# Patient Record
Sex: FEMALE | Race: WHITE
[De-identification: names, ages, dates, MRNs, and addresses within clinical notes are randomized per-mention and may not be internally consistent; named-entity substitution may affect disease eponyms.]

---

## 2019-09-30 ENCOUNTER — HOSPITAL ENCOUNTER (INPATIENT)
Dept: HOSPITAL 74 - YASAS | Age: 59
LOS: 4 days | Discharge: HOME | DRG: 773 | End: 2019-10-04
Attending: SURGERY | Admitting: SURGERY
Payer: COMMERCIAL

## 2019-09-30 VITALS — BODY MASS INDEX: 29.2 KG/M2

## 2019-09-30 DIAGNOSIS — R60.0: ICD-10-CM

## 2019-09-30 DIAGNOSIS — Z86.69: ICD-10-CM

## 2019-09-30 DIAGNOSIS — F19.24: ICD-10-CM

## 2019-09-30 DIAGNOSIS — F32.9: ICD-10-CM

## 2019-09-30 DIAGNOSIS — Z86.73: ICD-10-CM

## 2019-09-30 DIAGNOSIS — J45.909: ICD-10-CM

## 2019-09-30 DIAGNOSIS — Z86.711: ICD-10-CM

## 2019-09-30 DIAGNOSIS — D72.819: ICD-10-CM

## 2019-09-30 DIAGNOSIS — Z88.6: ICD-10-CM

## 2019-09-30 DIAGNOSIS — N63.0: ICD-10-CM

## 2019-09-30 DIAGNOSIS — Z86.718: ICD-10-CM

## 2019-09-30 DIAGNOSIS — F11.23: Primary | ICD-10-CM

## 2019-09-30 DIAGNOSIS — A53.0: ICD-10-CM

## 2019-09-30 DIAGNOSIS — F19.282: ICD-10-CM

## 2019-09-30 DIAGNOSIS — F17.210: ICD-10-CM

## 2019-09-30 PROCEDURE — HZ2ZZZZ DETOXIFICATION SERVICES FOR SUBSTANCE ABUSE TREATMENT: ICD-10-PCS | Performed by: ALLERGY & IMMUNOLOGY

## 2019-09-30 RX ADMIN — Medication SCH MG: at 22:23

## 2019-09-30 RX ADMIN — Medication PRN MG: at 22:23

## 2019-09-30 RX ADMIN — GABAPENTIN SCH MG: 300 CAPSULE ORAL at 22:22

## 2019-09-30 NOTE — HP
COWS





- Scale


Resting Pulse: 1= [place] 


Sweatin=Flushed/Facial Moisture


Restless Observation: 1= Difficult to Sit Still


Pupil Size: 0= Normal to Room Light


Bone or Joint Aches: 2= Severe Diffuse Aches


Runny Nose/ Eye Tearin= Runny Nose/Eyes


GI Upset > 30mins: 3= Vomiting/Diarrhea


Tremor Observation: 2= Slight Tremor Visible


Yawning Observation: 2= >3x During Session


Anxiety or Irritability: 4=Extreme Anxiety


Goose Flesh Skin: 3=Piloerection


COWS Score: 22





CIWA Score





- Admission Criteria


[facility] Guidelines: Admission for Medically Managed Detox: 


Requires at least one of the followin. CIWA greater than 12


2. Seizures within the past 24 hours


3. Delirium tremens within the past 24 hours


4. Hallucinations within the past 24 hours


5. Acute intervention needed for co  occurring medical disorder


6. Acute intervention needed for co  occurring psychiatric disorder


7. Severe withdrawal that cannot be handled at a lower level of care (continued


    vomiting, continued diarrhea, abnormal vital signs) requiring intravenous


    medication and/or fluids


8. Pregnancy








Admitting History and Physical





- Admission


History Source: Patient


Limitations to Obtaining History: No Limitations





Admission ROS [facility]


Chief Complaint: 





Yadira Duffy is a 59 year old female who is presenting for heroin detox.


Allergies/Adverse Reactions: 


 Allergies











Allergy/AdvReac Type Severity Reaction Status Date / Time


 


No Known Allergies Allergy   Verified 19 13:18











History of Present Illness: 





Yadira Duffy is a 59 year old female who is presenting for heroin detox.


Heroin: uses 4-5 bags per day. Last use was 2 days prior. Has been using for 17 

years. Inhales. Denies IVDU. Denies overdose. Denies having a Narcan kit at 

home and has never used one. Has never been on a methadone maintenance program. 

Has been on methadone in the past for pain. Has more than 1 dealer.


Denies use of other substances. Has been on several drugs methadone, oxy, 

benzos in the last couple of days to try and help the symptoms of withdrawal.





Has never been detox in the past. Has been to rehab many years in the past.





Is interested in outpatient rehab after completing detox because she has a job 

that she wants to go back to. Wants to go on a methadone program.





Utox: MOP, OXY, MTD, BZO





Medical History: hx of PE/DVT, TIA/CVA, ?hx of seizure, neck pain, breast lump, 

asthma, edema of bilateral lower extremities


Surgical History: hysterectomy


Smokin/2 ppd


Social: lives with , has a job as  at a [employer].





- Ebola screening


Have you traveled outside of the country in the last 21 days: No


Have you had contact with anyone from an Ebola affected area: No


Do you have a fever: No





- Review of Systems


Constitutional: Chills, Loss of Appetite, Weakness


EENT: reports: Nose Congestion, Other (rhinnorhea)


Respiratory: reports: No Symptoms reported


Cardiac: reports: Palpitations


GI: reports: Diarrhea, Nausea, Vomiting, Abdominal cramping


: reports: No Symptoms Reported


Musculoskeletal: reports: Back Pain, Muscle Pain, Neck Pain


Integumentary: reports: No Symptoms Reported


Neuro: reports: Headache, Tremors, Weakness


Endocrine: reports: No Symptoms Reported


Hematology: reports: Blood Clots


Psychiatric: reports: Judgement Intact, Agitated, Anxious, Depressed





Patient History





- Patient Medical History


Hx Anemia: No


Hx Asthma: Yes


Hx Chronic Obstructive Pulmonary Disease (COPD): No


Hx Cancer: No (has lump on breast she is being followed for)


Hx Cardiac Disorders: No


Hx Congestive Heart Failure: No


Hx Hypertension: No


Hx Hypercholesterolemia: No


Hx Pacemaker: No


HX Cerebrovascular Accident: Yes (hx of weakness on one side, no residual 

weakness)


Hx Seizures: Yes (?hx when found unconscious, not secondary to an overdose)


Hx Dementia: No


Hx Diabetes: No


Hx Gastrointestinal Disorders: No


Hx Liver Disease: No


Hx Genitourinary Disorders: No


Hx Sexually Transmitted Disorders: No


Hx Renal Disease (ESRD): No


Hx Thyroid Disease: No


Hx Human Immunodeficiency Virus (HIV): No


Hx Hepatitis C: No


Hx Depression: Yes


Hx Suicide Attempt: No


Hx Bipolar Disorder: No


Hx Schizophrenia: No





- Patient Surgical History


Hx Neurologic Surgery: No


Hx Cataract Extraction: No


Hx Cardiac Surgery: No


Hx Lung Surgery: No


Hx Breast Surgery: No


Hx Breast Biopsy: No


Hx Abdominal Surgery: No


Hx Appendectomy: No


Hx Cholecystectomy: No


Hx Genitourinary Surgery: No


Hx  Section: No


Hx Orthopedic Surgery: No


Hx Hysterectomy: Yes


Other Surgical History: hx of blood clot removal





- PPD History


Previous Implant?: No


Documented Results: Negative w/o proof


Implanted On Prior [facility] Admission?: No


PPD to be Administered?: Yes





- Reproductive History


Patient is a Female of Child Bearing Age (11 -55 yrs old): No


Patient Pregnant: No





- Smoking Cessation


Smoking history: Current every day smoker


Have you smoked in the past 12 months: Yes


Aproximately how many cigarettes per day: 10


Initiated information on smoking cessation: Yes


'Breaking Loose' booklet given: 19





- Substance & Tx. History


Hx Alcohol Use: Yes


Hx Substance Use: Yes


Substance Use Type: Heroin, Opiates





- Substances abused


  ** Heroin


Substance route: Inhalation


Frequency: Daily


Amount used: 10 bags or more


Age of first use: 59


Date of last use: 19





Admission Physical Exam [facility]





- Vital Signs


Vital Signs: 


 Vital Signs - 24 hr











  19





  13:15


 


Temperature 97.7 F


 


Pulse Rate 91 H


 


Respiratory 18





Rate 


 


Blood Pressure 129/79














- Physical


General Appearance: Yes: Moderate Distress, Sweating, Anxious


HEENTM: Yes: Normocephalic, Normal Voice, JULIA, Other (dry mucous membranes)


Respiratory: Yes: Lungs Clear, Normal Breath Sounds, No Respiratory Distress, 

No Accessory Muscle Use


Neck: Yes: No masses,lesions,Nodules, Trachea in good position


Breast: Yes: Breast Exam Deferred


Cardiology: Yes: Regular Rhythm, Regular Rate, S1, S2


Abdominal: Yes: Normal Bowel Sounds, Non Tender, Flat, Soft


Genitourinary: Yes: Within Normal Limits


Musculoskeletal: Yes: Back pain, Muscle weakness


Extremities: Yes: Other (bilateral lymphedema noted)


Neurological: Yes: CNs II-XII NML intact, Alert, Motor Strength 5/5


Integumentary: Yes: Normal Color, Dry, Warm





- Diagnostic


(1) Heroin abuse


Current Visit: Yes   Status: Acute   





(2) Asthma


Current Visit: Yes   Status: Acute   





(3) History of DVT (deep vein thrombosis)


Current Visit: Yes   Status: Acute   





(4) History of pulmonary embolus (PE)


Current Visit: Yes   Status: Acute   





(5) History of CVA (cerebrovascular accident)


Current Visit: Yes   Status: Acute   





(6) Edema of both feet


Current Visit: Yes   Status: Acute   





Cleared for Admission S





- Detox or Rehab


[facility] Level of Care: Medically Managed


Detox Regimen/Protocol: Methadone





Breathalyzer





- Breathalyzer


Breathalyzer: 0





Urine Drug Screen





- Test Device


Lot number: [id number]


Expiration date: 21





- Control


Is test valid?: Yes





- Results


Drug screen NEGATIVE: No


Urine drug screen results: MOP-Opiates, OXY-Oxycodone, MTD-Methadone, BZO-

Benzodiazepines





Inpatient Rehab Admission





- Rehab Decision to Admit


Inpatient rehab admission?: No

## 2019-09-30 NOTE — PN
Teaching Attending Note


Name of Resident: Javid Barber





ATTENDING PHYSICIAN STATEMENT





I saw and evaluated the patient.


I reviewed the resident's note and discussed the case with the resident.


I agree with the resident's findings and plan as documented.








SUBJECTIVE:


58 yo with h/o PE/DVT, h/o asthma, here for heroin detox. Last use 2 days ago. 

Has been using for the last 17 years.





COWS-22


OBJECTIVE:


 Vital Signs - 24 hr











  09/30/19





  13:15


 


Temperature 97.7 F


 


Pulse Rate 91 H


 


Respiratory 18





Rate 


 


Blood Pressure 129/79











alert and oriented


tremulous


ASSESSMENT AND PLAN:


Heroin use disorder- methadone detox

## 2019-10-01 LAB
ALBUMIN SERPL-MCNC: 3.6 G/DL (ref 3.4–5)
ALP SERPL-CCNC: 86 U/L (ref 45–117)
ALT SERPL-CCNC: 19 U/L (ref 13–61)
ANION GAP SERPL CALC-SCNC: 9 MMOL/L (ref 8–16)
AST SERPL-CCNC: 17 U/L (ref 15–37)
BILIRUB SERPL-MCNC: 0.6 MG/DL (ref 0.2–1)
BUN SERPL-MCNC: 14.9 MG/DL (ref 7–18)
CALCIUM SERPL-MCNC: 8.9 MG/DL (ref 8.5–10.1)
CHLORIDE SERPL-SCNC: 105 MMOL/L (ref 98–107)
CO2 SERPL-SCNC: 24 MMOL/L (ref 21–32)
CREAT SERPL-MCNC: 0.7 MG/DL (ref 0.55–1.3)
DEPRECATED RDW RBC AUTO: 14.5 % (ref 11.6–15.6)
GLUCOSE SERPL-MCNC: 62 MG/DL (ref 74–106)
HCT VFR BLD CALC: 37.2 % (ref 32.4–45.2)
HGB BLD-MCNC: 12.6 GM/DL (ref 10.7–15.3)
MCH RBC QN AUTO: 30.7 PG (ref 25.7–33.7)
MCHC RBC AUTO-ENTMCNC: 33.9 G/DL (ref 32–36)
MCV RBC: 90.6 FL (ref 80–96)
PLATELET # BLD AUTO: 183 K/MM3 (ref 134–434)
PMV BLD: 10.1 FL (ref 7.5–11.1)
POTASSIUM SERPLBLD-SCNC: 4 MMOL/L (ref 3.5–5.1)
PROT SERPL-MCNC: 6.6 G/DL (ref 6.4–8.2)
RBC # BLD AUTO: 4.11 M/MM3 (ref 3.6–5.2)
SICKLE CELL SCREEN: NEGATIVE
SODIUM SERPL-SCNC: 139 MMOL/L (ref 136–145)
WBC # BLD AUTO: 2.7 K/MM3 (ref 4–10)

## 2019-10-01 RX ADMIN — METHOCARBAMOL PRN MG: 500 TABLET ORAL at 17:14

## 2019-10-01 RX ADMIN — GABAPENTIN SCH MG: 300 CAPSULE ORAL at 21:13

## 2019-10-01 RX ADMIN — METHOCARBAMOL PRN MG: 500 TABLET ORAL at 10:16

## 2019-10-01 RX ADMIN — LIDOCAINE SCH PATCH: 50 PATCH TOPICAL at 12:46

## 2019-10-01 RX ADMIN — HYDROXYZINE PAMOATE PRN MG: 25 CAPSULE ORAL at 21:14

## 2019-10-01 RX ADMIN — Medication SCH MG: at 21:13

## 2019-10-01 RX ADMIN — GABAPENTIN SCH MG: 300 CAPSULE ORAL at 05:49

## 2019-10-01 RX ADMIN — CITALOPRAM HYDROBROMIDE SCH MG: 20 TABLET ORAL at 12:46

## 2019-10-01 RX ADMIN — Medication SCH: at 21:12

## 2019-10-01 RX ADMIN — NICOTINE SCH MG: 21 PATCH TRANSDERMAL at 10:18

## 2019-10-01 RX ADMIN — Medication PRN MG: at 21:14

## 2019-10-01 RX ADMIN — Medication SCH TAB: at 10:16

## 2019-10-01 RX ADMIN — AMITRIPTYLINE HYDROCHLORIDE SCH MG: 25 TABLET, FILM COATED ORAL at 21:13

## 2019-10-01 RX ADMIN — NICOTINE POLACRILEX PRN MG: 2 GUM, CHEWING ORAL at 21:15

## 2019-10-01 RX ADMIN — GABAPENTIN SCH MG: 300 CAPSULE ORAL at 13:52
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
## 2019-10-01 NOTE — PN
[facility] COWS





- Scale


Resting Pulse: 1= [place] 


Sweatin= Chills/Flushing


Restless Observation: 1= Difficult to Sit Still


Pupil Size: 0= Normal to Room Light


Bone or Joint Aches: 2= Severe Diffuse Aches


Runny Nose/ Eye Tearin= None


GI Upset > 30mins: 0= None


Tremor Observation of Outstretched Hands: 2= Slight Tremor Visible


Yawning Observation: 1= 1-2x During Session


Anxiety or Irritability: 4=Extreme Anxiety


Goose Flesh Skin: 3=Piloerection


COWS Score: 15





BHS Progress Note (SOAP)


Subjective: 





Anxious (Severe), Restless, Tremors, Body Aches.


Objective: 


PATIENT A & O X 1 (UNCERTAIN ABOUT CURRENT DAY/ DATE AND ABOUT CURRENT LOCATION)

. PATIENT OBSERVED AMBULATING ON DETOX UNIT


UNASSISTED. IN NO ACUTE DISTRESS.





10/01/19 15:00


 Vital Signs











Temperature  97.9 F   10/01/19 13:26


 


Pulse Rate  81   10/01/19 13:26


 


Respiratory Rate  18   10/01/19 13:26


 


Blood Pressure  108/66   10/01/19 13:26


 


O2 Sat by Pulse Oximetry (%)      








 Laboratory Tests











  10/01/19 10/01/19 10/01/19





  08:45 08:45 08:45


 


WBC  2.7 L  


 


RBC  4.11  


 


Hgb  12.6  


 


Hct  37.2  


 


MCV  90.6  


 


MCH  30.7  


 


MCHC  33.9  


 


RDW  14.5  


 


Plt Count  183  


 


MPV  10.1  


 


Sickle Cell Screen  Negative  


 


Sodium   139 


 


Potassium   4.0 


 


Chloride   105 


 


Carbon Dioxide   24 


 


Anion Gap   9 


 


BUN   14.9 


 


Creatinine   0.7 


 


Est GFR (CKD-EPI)AfAm   109.91 


 


Est GFR (CKD-EPI)NonAf   94.84 


 


Random Glucose   62 L 


 


Calcium   8.9 


 


Total Bilirubin   0.6 


 


AST   17 


 


ALT   19 


 


Alkaline Phosphatase   86 


 


Total Protein   6.6 


 


Albumin   3.6 


 


HIV 1&2 Antibody Screen    Negative


 


HIV P24 Antigen    Negative











LABS NOTED.


DETOX ADMISSION RPR RESULT PENDING.





10/01/19 15:01


Assessment: 





10/01/19 15:01


WITHDRAWAL SYMPTOMS.


LEUKOPENIA.





10/01/19 15:02


Plan: 





CONTINUE DETOX.


PRN ROBAXIN PO FOR BODY ACHES / MUSCLE SPASMS.


LIDODERM PATCH FOR LOWER BACK PAIN.


PATIENT (IN COLLABORATION WITH  [facility]) REQUESTS TO BE 

DISCHARGED FROM DETOX UNIT ON FRIDAY, , SO THAT SHE MAY RETURN TO 

[facility] PROGRAM THAT SHE ATTENDED IN PAST FOR RE-INSTATEMENT BEFORE THE 

WEEKEND BEGINS. PATIENT'S CURRENT DETOX MEDICATION REGIMEN (METHADONE) MODIFIED 

ACCORDINGLY.

## 2019-10-01 NOTE — EKG
Test Reason : 

Blood Pressure : ***/*** mmHG

Vent. Rate : 064 BPM     Atrial Rate : 064 BPM

   P-R Int : 160 ms          QRS Dur : 090 ms

    QT Int : 408 ms       P-R-T Axes : 064 043 015 degrees

   QTc Int : 420 ms

 

NORMAL SINUS RHYTHM

NORMAL ECG

NO PREVIOUS ECGS AVAILABLE

Confirmed by Holland Ram MD ([id number]) on 10/1/2019 10:13:55 AM

 

Referred By: RUPINDER GORDON RES           Confirmed By:Holland Ram MD

## 2019-10-02 LAB
RPR: (no result)
TREPONEMA ANTIBODY: REACTIVE

## 2019-10-02 RX ADMIN — GABAPENTIN SCH MG: 300 CAPSULE ORAL at 21:28

## 2019-10-02 RX ADMIN — Medication SCH MG: at 21:27

## 2019-10-02 RX ADMIN — HYDROXYZINE PAMOATE PRN MG: 25 CAPSULE ORAL at 16:54

## 2019-10-02 RX ADMIN — METHOCARBAMOL PRN MG: 500 TABLET ORAL at 10:01

## 2019-10-02 RX ADMIN — METHOCARBAMOL PRN MG: 500 TABLET ORAL at 16:54

## 2019-10-02 RX ADMIN — GABAPENTIN SCH MG: 300 CAPSULE ORAL at 13:56

## 2019-10-02 RX ADMIN — CITALOPRAM HYDROBROMIDE SCH MG: 20 TABLET ORAL at 10:01

## 2019-10-02 RX ADMIN — Medication SCH TAB: at 10:01

## 2019-10-02 RX ADMIN — NICOTINE SCH MG: 21 PATCH TRANSDERMAL at 10:05

## 2019-10-02 RX ADMIN — LIDOCAINE SCH PATCH: 50 PATCH TOPICAL at 10:05

## 2019-10-02 RX ADMIN — NICOTINE POLACRILEX PRN MG: 2 GUM, CHEWING ORAL at 09:16

## 2019-10-02 RX ADMIN — HYDROXYZINE PAMOATE PRN MG: 25 CAPSULE ORAL at 21:28

## 2019-10-02 RX ADMIN — AMITRIPTYLINE HYDROCHLORIDE SCH MG: 25 TABLET, FILM COATED ORAL at 21:28

## 2019-10-02 RX ADMIN — Medication SCH: at 21:30

## 2019-10-02 RX ADMIN — GABAPENTIN SCH MG: 300 CAPSULE ORAL at 06:01

## 2019-10-02 NOTE — PN
[facility] COWS





- Scale


Resting Pulse: 0= [place] 80 or Below


Sweatin= Chills/Flushing


Restless Observation: 1= Difficult to Sit Still


Pupil Size: 0= Normal to Room Light


Bone or Joint Aches: 0= None


Runny Nose/ Eye Tearin= None


GI Upset > 30mins: 0= None


Tremor Observation of Outstretched Hands: 2= Slight Tremor Visible


Yawning Observation: 1= 1-2x During Session


Anxiety or Irritability: 4=Extreme Anxiety


Goose Flesh Skin: 0=Smooth Skin


COWS Score: 9





BHS Progress Note (SOAP)


Subjective: 





Interrupted Sleep, Anxious (Severe), Restless, Tremors, Sweating.


Objective: 


PATIENT A & O X 1 (UNCERTAIN ABOUT CURRENT DAY/ DATE AND ABOUT CURRENT LOCATION)

. PATIENT OBSERVED AMBULATING ON DETOX UNIT UNASSISTED. IN NO ACUTE DISTRESS.





10/02/19 14:53


 Vital Signs











Temperature  98.7 F   10/02/19 13:06


 


Pulse Rate  75   10/02/19 13:06


 


Respiratory Rate  18   10/02/19 13:06


 


Blood Pressure  104/67   10/02/19 13:06


 


O2 Sat by Pulse Oximetry (%)      








 Laboratory Tests











  10/01/19 10/01/19 10/01/19





  08:45 08:45 08:45


 


WBC  2.7 L  


 


RBC  4.11  


 


Hgb  12.6  


 


Hct  37.2  


 


MCV  90.6  


 


MCH  30.7  


 


MCHC  33.9  


 


RDW  14.5  


 


Plt Count  183  


 


MPV  10.1  


 


Sickle Cell Screen  Negative  


 


Sodium   139 


 


Potassium   4.0 


 


Chloride   105 


 


Carbon Dioxide   24 


 


Anion Gap   9 


 


BUN   14.9 


 


Creatinine   0.7 


 


Est GFR (CKD-EPI)AfAm   109.91 


 


Est GFR (CKD-EPI)NonAf   94.84 


 


Random Glucose   62 L 


 


Calcium   8.9 


 


Total Bilirubin   0.6 


 


AST   17 


 


ALT   19 


 


Alkaline Phosphatase   86 


 


Total Protein   6.6 


 


Albumin   3.6 


 


RPR Titer    Reactive 1:1 H


 


T.pallidum Ab (MHA)    Reactive


 


HIV 1&2 Antibody Screen   


 


HIV P24 Antigen   














  10/01/19





  08:45


 


WBC 


 


RBC 


 


Hgb 


 


Hct 


 


MCV 


 


MCH 


 


MCHC 


 


RDW 


 


Plt Count 


 


MPV 


 


Sickle Cell Screen 


 


Sodium 


 


Potassium 


 


Chloride 


 


Carbon Dioxide 


 


Anion Gap 


 


BUN 


 


Creatinine 


 


Est GFR (CKD-EPI)AfAm 


 


Est GFR (CKD-EPI)NonAf 


 


Random Glucose 


 


Calcium 


 


Total Bilirubin 


 


AST 


 


ALT 


 


Alkaline Phosphatase 


 


Total Protein 


 


Albumin 


 


RPR Titer 


 


T.pallidum Ab (MHA) 


 


HIV 1&2 Antibody Screen  Negative


 


HIV P24 Antigen  Negative








LABS NOTED.





DETOX ADMISSION RPR RESULT NOTED: REACTIVE 1:1 (MHATP: PREVIOUSLY REACTIVE).


PATIENT REPORTS HISTORY OF MEMORY LOSS AND THAT SHE DOES NOT RECALL WHETHER OR 

NOT SHE WAS TREATED FOR SYPHILIS IN THE PAST. WILL DISCUSS FURTHER WITH PATIENT 

AND DETERMINE WHETHER OR NOT TO INITIATE TREATMENT.





10/02/19 15:00


Assessment: 





10/02/19 14:55


WITHDRAWAL SYMPTOMS.


REACTIVE RPR.


LEUKOPENIA.


Plan: 





CONTINUE DETOX.


INCREASE DAILY PO WATER INTAKE.


PATIENT NOTED TO HAVE HISTORY OF CVA/TIA AND PE/DVT ON DETOX ADMISSION H & P 

INTAKE. PATIENT REPORTS THAT SHE HAD BEEN PRESCRIBED PROHYLACTIC ANTI-CLOTTING 

MEDICATION IN PAST, BUT THAT SHE STOPPED TAKING THE MEDIATION (MANY YEARS AGO) 

ON HER OWN, WITHOUT CONSULTING HER MEDICAL PROVIDER, BECAUSE SHE DID NOT LIKE 

THE WAY THAT THE MEDICATION MADE HER FEEL. PATIENT REFUSED TO TAKE ASPIRIN (81 

MG PO DAILY) AT THIS TIME BECAUSE SHE SAYS THAT IT "UPSETS HER STOMACH TOO MUCH.

"
PATIENT ADVISED TO FOLLOW-UP WITH  AS SOON AS POSSIBLE AFTER DISCHARGE 

FROM DETOX FOR GENERAL MEDICAL ASSESSMENT AND FOR HISTORY OF OF CVA/TIA AND FOR 

DVT/PE AND FOR SUBSEQUENT LONG-TERM TREATMENT. PATIENT VERBALIZED UNDERSTANDING 

OF RECOMMENDATION.

## 2019-10-03 RX ADMIN — Medication SCH: at 21:46

## 2019-10-03 RX ADMIN — METHOCARBAMOL PRN MG: 500 TABLET ORAL at 21:45

## 2019-10-03 RX ADMIN — Medication SCH TAB: at 10:11

## 2019-10-03 RX ADMIN — CITALOPRAM HYDROBROMIDE SCH MG: 20 TABLET ORAL at 10:11

## 2019-10-03 RX ADMIN — GABAPENTIN SCH MG: 300 CAPSULE ORAL at 13:31

## 2019-10-03 RX ADMIN — GABAPENTIN SCH MG: 300 CAPSULE ORAL at 06:00

## 2019-10-03 RX ADMIN — HYDROXYZINE PAMOATE PRN MG: 25 CAPSULE ORAL at 10:15

## 2019-10-03 RX ADMIN — NICOTINE POLACRILEX PRN MG: 2 GUM, CHEWING ORAL at 08:23

## 2019-10-03 RX ADMIN — Medication SCH MG: at 21:45

## 2019-10-03 RX ADMIN — AMITRIPTYLINE HYDROCHLORIDE SCH MG: 25 TABLET, FILM COATED ORAL at 21:45

## 2019-10-03 RX ADMIN — LIDOCAINE SCH PATCH: 50 PATCH TOPICAL at 10:11

## 2019-10-03 RX ADMIN — HYDROXYZINE PAMOATE PRN MG: 25 CAPSULE ORAL at 17:57

## 2019-10-03 RX ADMIN — HYDROXYZINE PAMOATE PRN MG: 25 CAPSULE ORAL at 21:45

## 2019-10-03 RX ADMIN — GABAPENTIN SCH MG: 300 CAPSULE ORAL at 21:45

## 2019-10-03 RX ADMIN — NICOTINE SCH MG: 21 PATCH TRANSDERMAL at 10:11

## 2019-10-03 NOTE — PN
[facility] COWS





- Scale


Resting Pulse: 0= [place] 80 or Below


Sweatin= Chills/Flushing


Restless Observation: 1= Difficult to Sit Still


Pupil Size: 0= Normal to Room Light


Bone or Joint Aches: 1= Mild Discomfort


Runny Nose/ Eye Tearin= None


GI Upset > 30mins: 0= None


Tremor Observation of Outstretched Hands: 0= None


Yawning Observation: 1= 1-2x During Session


Anxiety or Irritability: 4=Extreme Anxiety


Goose Flesh Skin: 0=Smooth Skin


COWS Score: 8





BHS Progress Note (SOAP)


Subjective: 





Anxious, Sweating, Interrupted Sleep.


Objective: 


PATIENT A & O X 1 (UNCERTAIN ABOUT CURRENT DAY/ DATE AND ABOUT CURRENT LOCATION)

. PATIENT OBSERVED AMBULATING ON DETOX UNIT UNASSISTED. IN NO ACUTE DISTRESS.





10/03/19 17:24


 Vital Signs











Temperature  97.2 F L  10/03/19 13:18


 


Pulse Rate  72   10/03/19 13:18


 


Respiratory Rate  18   10/03/19 13:18


 


Blood Pressure  92/62   10/03/19 13:18


 


O2 Sat by Pulse Oximetry (%)      








 Laboratory Tests











  10/01/19 10/01/19 10/01/19





  08:45 08:45 08:45


 


WBC  2.7 L  


 


RBC  4.11  


 


Hgb  12.6  


 


Hct  37.2  


 


MCV  90.6  


 


MCH  30.7  


 


MCHC  33.9  


 


RDW  14.5  


 


Plt Count  183  


 


MPV  10.1  


 


Sickle Cell Screen  Negative  


 


Sodium   139 


 


Potassium   4.0 


 


Chloride   105 


 


Carbon Dioxide   24 


 


Anion Gap   9 


 


BUN   14.9 


 


Creatinine   0.7 


 


Est GFR (CKD-EPI)AfAm   109.91 


 


Est GFR (CKD-EPI)NonAf   94.84 


 


Random Glucose   62 L 


 


Calcium   8.9 


 


Total Bilirubin   0.6 


 


AST   17 


 


ALT   19 


 


Alkaline Phosphatase   86 


 


Total Protein   6.6 


 


Albumin   3.6 


 


RPR Titer    Reactive 1:1 H


 


T.pallidum Ab (MHA)    Reactive


 


HIV 1&2 Antibody Screen   


 


HIV P24 Antigen   














  10/01/19





  08:45


 


WBC 


 


RBC 


 


Hgb 


 


Hct 


 


MCV 


 


MCH 


 


MCHC 


 


RDW 


 


Plt Count 


 


MPV 


 


Sickle Cell Screen 


 


Sodium 


 


Potassium 


 


Chloride 


 


Carbon Dioxide 


 


Anion Gap 


 


BUN 


 


Creatinine 


 


Est GFR (CKD-EPI)AfAm 


 


Est GFR (CKD-EPI)NonAf 


 


Random Glucose 


 


Calcium 


 


Total Bilirubin 


 


AST 


 


ALT 


 


Alkaline Phosphatase 


 


Total Protein 


 


Albumin 


 


RPR Titer 


 


T.pallidum Ab (MHA) 


 


HIV 1&2 Antibody Screen  Negative


 


HIV P24 Antigen  Negative








LABS NOTED.


Assessment: 





10/03/19 17:25


WITHDRAWAL SYMPTOMS.


LEUKOPENIA.


REACTIVE RPR.


Plan: 





CONTINUE DETOX.


INCREASE DAILY PO WATER INTAKE.


PATIENT REPORTS THAT SHE SUFFERS FROM POOR MEMORY AND THAT SHE DOES NOT RECALL 

IF SHE EVER HAD BEEN TREATED FOR SYPHILIS IN THE PAST OR IF SHE EVEN RECALLS 

EVER HAVE BEEN TOLD THAT SHE HAS SYPHILIS BY A MEDICAL PROVIDER IN THE PAST.


AS PROPHYLACTIC PRECAUTION, 1ST DOSE OF BICILLIN-LA ORDERED FOR PATIENT FOR 

TODAY. PATIENT ADVISED TO FOLLOW-UP WITH  DR. ADAME ([place], [place]

) AFTER DISCHARGE FROM DETOX UNIT FOR FURTHER MEDICAL EVALUATION AND FOR 

REACTIVE RPR RESULT NOTED ON DETOX ADMISSION LABORATORY ASSESSMENT. PATIENT 

VERBALIZED UNDERSTANDING OF RECOMMENDATION. COPIES OF RESULTS OF ALL LABS DRAWN 

(INCLUDING RPR) WHILE ADMITTED FOR DETOX WILL BE GIVEN TO PATIENT AT TIME OF 

DISCHARGE FROM DETOX UNIT.  AT [facility] PROGRAM ([place], [place]) THAT PATIENT WILL GO TO AFTER DISCHARGE TOMORROW WILL BE 

CONTACTED TO HELP FACILITATE PROCESS OF GETTING PATIENT TO FOLLOW-UP WITH HER 

 AFTER DISCHARGE FROM DETOX UNIT IN ORDER TO HAVE TO REMAINING INJECTION OF 

BICILLIN ADMINISTERED OVER NEXT TWO WEEKS.

## 2019-10-04 VITALS — DIASTOLIC BLOOD PRESSURE: 68 MMHG | SYSTOLIC BLOOD PRESSURE: 104 MMHG | HEART RATE: 75 BPM | TEMPERATURE: 97.9 F

## 2019-10-04 RX ADMIN — GABAPENTIN SCH MG: 300 CAPSULE ORAL at 05:59
